# Patient Record
Sex: MALE | Race: WHITE | NOT HISPANIC OR LATINO | Employment: FULL TIME | RURAL
[De-identification: names, ages, dates, MRNs, and addresses within clinical notes are randomized per-mention and may not be internally consistent; named-entity substitution may affect disease eponyms.]

---

## 2022-07-07 ENCOUNTER — OFFICE VISIT (OUTPATIENT)
Dept: FAMILY MEDICINE CLINIC | Facility: CLINIC | Age: 57
End: 2022-07-07

## 2022-07-07 VITALS
HEART RATE: 67 BPM | HEIGHT: 69 IN | WEIGHT: 237.6 LBS | SYSTOLIC BLOOD PRESSURE: 112 MMHG | BODY MASS INDEX: 35.19 KG/M2 | OXYGEN SATURATION: 98 % | DIASTOLIC BLOOD PRESSURE: 82 MMHG

## 2022-07-07 DIAGNOSIS — Z12.5 SCREENING FOR PROSTATE CANCER: ICD-10-CM

## 2022-07-07 DIAGNOSIS — I10 BENIGN ESSENTIAL HTN: ICD-10-CM

## 2022-07-07 DIAGNOSIS — Z00.00 ANNUAL PHYSICAL EXAM: Primary | ICD-10-CM

## 2022-07-07 DIAGNOSIS — Z12.11 SCREENING FOR COLON CANCER: ICD-10-CM

## 2022-07-07 PROBLEM — E66.01 MORBID OBESITY: Status: ACTIVE | Noted: 2022-07-07

## 2022-07-07 LAB
BILIRUB BLD-MCNC: NEGATIVE MG/DL
CLARITY, POC: CLEAR
COLOR UR: YELLOW
EXPIRATION DATE: NORMAL
GLUCOSE UR STRIP-MCNC: NEGATIVE MG/DL
KETONES UR QL: NEGATIVE
LEUKOCYTE EST, POC: NEGATIVE
Lab: NORMAL
NITRITE UR-MCNC: NEGATIVE MG/ML
PH UR: 6.5 [PH] (ref 5–8)
PROT UR STRIP-MCNC: NEGATIVE MG/DL
RBC # UR STRIP: NEGATIVE /UL
SP GR UR: 1.02 (ref 1–1.03)
UROBILINOGEN UR QL: NORMAL

## 2022-07-07 PROCEDURE — 81003 URINALYSIS AUTO W/O SCOPE: CPT | Performed by: NURSE PRACTITIONER

## 2022-07-07 PROCEDURE — 36415 COLL VENOUS BLD VENIPUNCTURE: CPT | Performed by: NURSE PRACTITIONER

## 2022-07-07 PROCEDURE — 99396 PREV VISIT EST AGE 40-64: CPT | Performed by: NURSE PRACTITIONER

## 2022-07-07 RX ORDER — LISINOPRIL AND HYDROCHLOROTHIAZIDE 20; 12.5 MG/1; MG/1
TABLET ORAL
COMMUNITY
Start: 2022-04-14 | End: 2022-07-07 | Stop reason: SDUPTHER

## 2022-07-07 RX ORDER — LISINOPRIL AND HYDROCHLOROTHIAZIDE 20; 12.5 MG/1; MG/1
1 TABLET ORAL DAILY
Qty: 90 TABLET | Refills: 2 | Status: SHIPPED | OUTPATIENT
Start: 2022-07-07

## 2022-07-07 NOTE — ASSESSMENT & PLAN NOTE
Fasting labs drawn.  UA completed.  Meds refilled.  Goal blood pressure less than 140/90.  Let me know if gets to about that.  Patient states up-to-date on Cologuard.  Denies colonoscopy.  States he will discuss shingles and tetanus vaccines with his pharmacist.  Risk of meds discussed and understood.  Annual dental and eye exams encouraged.  Return to clinic or ED with any issues or concerns.

## 2022-07-07 NOTE — PROGRESS NOTES
"Chief Complaint  Med Refill    Subjective          KRISTIAN Humphries presents to [facility] for preventative yearly exam.   History of Present Illness     Presents for med refill of lisinopril hydrochlorothiazide.  Blood pressure well controlled on this medication.  No smoking no alcohol use.  Cologuard completed last year.  Tries to watch his diet and does stay active.  No dizziness no headache no chest pain no chest pressure no shortness of breath no trouble breathing no urinary or bowel issues.  Overall states he is doing well.    Objective   Vital Signs:   /82   Pulse 67   Ht 175.3 cm (69\")   Wt 108 kg (237 lb 9.6 oz)   SpO2 98%   BMI 35.09 kg/m²     Body mass index is 35.09 kg/m².    Predictive Model Details   No score data available for Risk of Fall        PHQ-9 Depression Screening  Little interest or pleasure in doing things? 0-->not at all   Feeling down, depressed, or hopeless? 0-->not at all   Trouble falling or staying asleep, or sleeping too much?     Feeling tired or having little energy?     Poor appetite or overeating?     Feeling bad about yourself - or that you are a failure or have let yourself or your family down?     Trouble concentrating on things, such as reading the newspaper or watching television?     Moving or speaking so slowly that other people could have noticed? Or the opposite - being so fidgety or restless that you have been moving around a lot more than usual?     Thoughts that you would be better off dead, or of hurting yourself in some way?     PHQ-9 Total Score 0   If you checked off any problems, how difficult have these problems made it for you to do your work, take care of things at home, or get along with other people?       Patient screened positive for depression based on a PHQ-9 score of 0 on 7/7/2022. Follow-up recommendations include:     Immunization History   Administered Date(s) Administered   • Hepatitis A 08/31/2018, " 03/14/2019   • Influenza, Unspecified 10/01/2018   • Tdap 01/03/2020       Review of Systems   Constitutional: Negative for chills, fatigue, fever, unexpected weight gain and unexpected weight loss.   HENT: Negative for congestion.    Eyes: Negative for blurred vision and visual disturbance.   Respiratory: Negative.    Cardiovascular: Negative.    Gastrointestinal: Negative.    Genitourinary: Negative for decreased urine volume, dysuria, frequency, hematuria and urgency.   Musculoskeletal: Negative for arthralgias and back pain.   Skin: Negative for rash.   Neurological: Negative.    Psychiatric/Behavioral: Negative.        Past History:  Medical History: has a past medical history of Hypertension.   Surgical History: has no past surgical history on file.   Family History: family history includes Alzheimer's disease in his mother; Coronary artery disease in his mother.   Social History: reports that he has never smoked. He has never used smokeless tobacco.      Current Outpatient Medications:   •  lisinopril-hydrochlorothiazide (PRINZIDE,ZESTORETIC) 20-12.5 MG per tablet, Take 1 tablet by mouth Daily., Disp: 90 tablet, Rfl: 2   Allergies: Patient has no known allergies.    Physical Exam  Constitutional:       Appearance: Normal appearance.   HENT:      Head: Normocephalic.      Right Ear: Tympanic membrane, ear canal and external ear normal.      Left Ear: Tympanic membrane, ear canal and external ear normal.      Nose: Nose normal.      Mouth/Throat:      Mouth: Mucous membranes are moist.      Pharynx: Oropharynx is clear.   Eyes:      Extraocular Movements: Extraocular movements intact.      Conjunctiva/sclera: Conjunctivae normal.      Pupils: Pupils are equal, round, and reactive to light.   Cardiovascular:      Rate and Rhythm: Normal rate and regular rhythm.      Heart sounds: Normal heart sounds.   Pulmonary:      Effort: Pulmonary effort is normal.      Breath sounds: Normal breath sounds.   Abdominal:       General: Abdomen is flat. Bowel sounds are normal.      Palpations: Abdomen is soft.   Skin:     General: Skin is warm.   Neurological:      General: No focal deficit present.      Mental Status: He is alert and oriented to person, place, and time. Mental status is at baseline.   Psychiatric:         Mood and Affect: Mood normal.         Behavior: Behavior normal.         Thought Content: Thought content normal.         Judgment: Judgment normal.          Result Review :                     Assessment and Plan    Diagnoses and all orders for this visit:    1. Annual physical exam (Primary)  Assessment & Plan:  Fasting labs drawn.  UA completed.  Meds refilled.  Goal blood pressure less than 140/90.  Let me know if gets to about that.  Patient states up-to-date on Cologuard.  Denies colonoscopy.  States he will discuss shingles and tetanus vaccines with his pharmacist.  Risk of meds discussed and understood.  Annual dental and eye exams encouraged.  Return to clinic or ED with any issues or concerns.    Orders:  -     CBC & Differential; Future  -     Comprehensive Metabolic Panel; Future  -     TSH Rfx On Abnormal To Free T4; Future  -     POC Urinalysis Dipstick, Automated; Future    2. Benign essential HTN  -     Lipid Panel; Future    3. Screening for prostate cancer  -     PSA Total, Reflex To Free; Future    4. Screening for colon cancer    Other orders  -     lisinopril-hydrochlorothiazide (PRINZIDE,ZESTORETIC) 20-12.5 MG per tablet; Take 1 tablet by mouth Daily.  Dispense: 90 tablet; Refill: 2            Class 2 Severe Obesity (BMI >=35 and <=39.9). Obesity-related health conditions include the following: hypertension. Obesity is improving with treatment. BMI is is above average; BMI management plan is completed. We discussed portion control and increasing exercise.       Follow Up   Return in about 1 year (around 7/7/2023).  Patient was given instructions and counseling regarding his condition or for health  maintenance advice. Please see specific information pulled into the AVS if appropriate.     MARY KAY Ireland

## 2022-07-08 LAB
ALBUMIN SERPL-MCNC: 4.4 G/DL (ref 3.8–4.9)
ALBUMIN/GLOB SERPL: 1.7 {RATIO} (ref 1.2–2.2)
ALP SERPL-CCNC: 81 IU/L (ref 44–121)
ALT SERPL-CCNC: 14 IU/L (ref 0–44)
AST SERPL-CCNC: 19 IU/L (ref 0–40)
BASOPHILS # BLD AUTO: 0.1 X10E3/UL (ref 0–0.2)
BASOPHILS NFR BLD AUTO: 2 %
BILIRUB SERPL-MCNC: 0.9 MG/DL (ref 0–1.2)
BUN SERPL-MCNC: 11 MG/DL (ref 6–24)
BUN/CREAT SERPL: 14 (ref 9–20)
CALCIUM SERPL-MCNC: 9.3 MG/DL (ref 8.7–10.2)
CHLORIDE SERPL-SCNC: 94 MMOL/L (ref 96–106)
CHOLEST SERPL-MCNC: 188 MG/DL (ref 100–199)
CO2 SERPL-SCNC: 25 MMOL/L (ref 20–29)
CREAT SERPL-MCNC: 0.77 MG/DL (ref 0.76–1.27)
EGFRCR SERPLBLD CKD-EPI 2021: 104 ML/MIN/1.73
EOSINOPHIL # BLD AUTO: 0.5 X10E3/UL (ref 0–0.4)
EOSINOPHIL NFR BLD AUTO: 8 %
ERYTHROCYTE [DISTWIDTH] IN BLOOD BY AUTOMATED COUNT: 11.7 % (ref 11.6–15.4)
GLOBULIN SER CALC-MCNC: 2.6 G/DL (ref 1.5–4.5)
GLUCOSE SERPL-MCNC: 87 MG/DL (ref 65–99)
HCT VFR BLD AUTO: 41.6 % (ref 37.5–51)
HDLC SERPL-MCNC: 60 MG/DL
HGB BLD-MCNC: 14.4 G/DL (ref 13–17.7)
IMM GRANULOCYTES # BLD AUTO: 0 X10E3/UL (ref 0–0.1)
IMM GRANULOCYTES NFR BLD AUTO: 0 %
LDLC SERPL CALC-MCNC: 116 MG/DL (ref 0–99)
LYMPHOCYTES # BLD AUTO: 1.6 X10E3/UL (ref 0.7–3.1)
LYMPHOCYTES NFR BLD AUTO: 26 %
MCH RBC QN AUTO: 30.3 PG (ref 26.6–33)
MCHC RBC AUTO-ENTMCNC: 34.6 G/DL (ref 31.5–35.7)
MCV RBC AUTO: 88 FL (ref 79–97)
MONOCYTES # BLD AUTO: 0.6 X10E3/UL (ref 0.1–0.9)
MONOCYTES NFR BLD AUTO: 9 %
NEUTROPHILS # BLD AUTO: 3.4 X10E3/UL (ref 1.4–7)
NEUTROPHILS NFR BLD AUTO: 55 %
PLATELET # BLD AUTO: 304 X10E3/UL (ref 150–450)
POTASSIUM SERPL-SCNC: 4.4 MMOL/L (ref 3.5–5.2)
PROT SERPL-MCNC: 7 G/DL (ref 6–8.5)
PSA SERPL-MCNC: 1.5 NG/ML (ref 0–4)
RBC # BLD AUTO: 4.75 X10E6/UL (ref 4.14–5.8)
REFLEX: NORMAL
SODIUM SERPL-SCNC: 133 MMOL/L (ref 134–144)
TRIGL SERPL-MCNC: 62 MG/DL (ref 0–149)
TSH SERPL DL<=0.005 MIU/L-ACNC: 1.19 UIU/ML (ref 0.45–4.5)
VLDLC SERPL CALC-MCNC: 12 MG/DL (ref 5–40)
WBC # BLD AUTO: 6.2 X10E3/UL (ref 3.4–10.8)

## 2022-07-12 ENCOUNTER — PATIENT MESSAGE (OUTPATIENT)
Dept: FAMILY MEDICINE CLINIC | Facility: CLINIC | Age: 57
End: 2022-07-12

## 2023-04-17 RX ORDER — LISINOPRIL AND HYDROCHLOROTHIAZIDE 20; 12.5 MG/1; MG/1
TABLET ORAL
Qty: 90 TABLET | Refills: 2 | Status: SHIPPED | OUTPATIENT
Start: 2023-04-17

## 2024-07-08 DIAGNOSIS — I10 PRIMARY HYPERTENSION: ICD-10-CM

## 2024-07-08 RX ORDER — LISINOPRIL AND HYDROCHLOROTHIAZIDE 20; 12.5 MG/1; MG/1
1 TABLET ORAL DAILY
Qty: 30 TABLET | Refills: 0 | Status: SHIPPED | OUTPATIENT
Start: 2024-07-08 | End: 2024-07-11 | Stop reason: SDUPTHER

## 2024-07-11 ENCOUNTER — OFFICE VISIT (OUTPATIENT)
Dept: FAMILY MEDICINE CLINIC | Facility: CLINIC | Age: 59
End: 2024-07-11
Payer: COMMERCIAL

## 2024-07-11 VITALS
HEART RATE: 67 BPM | WEIGHT: 240.44 LBS | OXYGEN SATURATION: 97 % | BODY MASS INDEX: 35.61 KG/M2 | DIASTOLIC BLOOD PRESSURE: 84 MMHG | SYSTOLIC BLOOD PRESSURE: 126 MMHG | HEIGHT: 69 IN

## 2024-07-11 DIAGNOSIS — Z12.11 SCREENING FOR COLON CANCER: ICD-10-CM

## 2024-07-11 DIAGNOSIS — Z11.59 NEED FOR HEPATITIS C SCREENING TEST: ICD-10-CM

## 2024-07-11 DIAGNOSIS — R73.03 PREDIABETES: ICD-10-CM

## 2024-07-11 DIAGNOSIS — Z12.5 SCREENING FOR PROSTATE CANCER: ICD-10-CM

## 2024-07-11 DIAGNOSIS — I10 BENIGN ESSENTIAL HTN: ICD-10-CM

## 2024-07-11 DIAGNOSIS — Z00.00 ANNUAL PHYSICAL EXAM: Primary | ICD-10-CM

## 2024-07-11 DIAGNOSIS — I10 PRIMARY HYPERTENSION: ICD-10-CM

## 2024-07-11 PROBLEM — E66.9 OBESITY (BMI 35.0-39.9 WITHOUT COMORBIDITY): Status: ACTIVE | Noted: 2024-07-11

## 2024-07-11 PROCEDURE — 99396 PREV VISIT EST AGE 40-64: CPT | Performed by: NURSE PRACTITIONER

## 2024-07-11 RX ORDER — FAMOTIDINE 20 MG/1
20 TABLET, FILM COATED ORAL 2 TIMES DAILY
COMMUNITY

## 2024-07-11 RX ORDER — LISINOPRIL AND HYDROCHLOROTHIAZIDE 20; 12.5 MG/1; MG/1
1 TABLET ORAL DAILY
Qty: 90 TABLET | Refills: 2 | Status: SHIPPED | OUTPATIENT
Start: 2024-07-11

## 2024-07-11 NOTE — PROGRESS NOTES
"Chief Complaint  Hypertension    Subjective          KRISTIAN Humphries presents to [facility] for preventative yearly exam.   History of Present Illness    Presents for annual exam.  Is fasting.  No smoking no alcohol use.  Tries to watch his diet he does stay active.  Hypertension well-controlled on lisinopril hydrochlorothiazide.  Due colonoscopy.  Tetanus vaccine up-to-date.  No dizziness no headache no chest pain no chest pressure no shortness of breath no trouble breathing no urinary or bowel issues.  Overall states he is doing well.    Objective   Vital Signs:   /84   Pulse 67   Ht 175.3 cm (69\")   Wt 109 kg (240 lb 7 oz)   SpO2 97%   BMI 35.51 kg/m²     Body mass index is 35.51 kg/m².    Predictive Model Details   No score data available for Risk of Fall        PHQ-9 Depression Screening  Little interest or pleasure in doing things? 0-->not at all   Feeling down, depressed, or hopeless? 0-->not at all   Trouble falling or staying asleep, or sleeping too much?     Feeling tired or having little energy?     Poor appetite or overeating?     Feeling bad about yourself - or that you are a failure or have let yourself or your family down?     Trouble concentrating on things, such as reading the newspaper or watching television?     Moving or speaking so slowly that other people could have noticed? Or the opposite - being so fidgety or restless that you have been moving around a lot more than usual?     Thoughts that you would be better off dead, or of hurting yourself in some way?     PHQ-9 Total Score 0   If you checked off any problems, how difficult have these problems made it for you to do your work, take care of things at home, or get along with other people?       Patient screened positive for depression based on a PHQ-9 score of 0 on 7/11/2024. Follow-up recommendations include:        Immunization History   Administered Date(s) Administered    Hepatitis A 08/31/2018, " 03/14/2019    Influenza, Unspecified 10/01/2018    Tdap 01/03/2020       Review of Systems   Constitutional: Negative.    HENT: Negative.     Eyes: Negative.    Respiratory: Negative.     Cardiovascular: Negative.    Gastrointestinal: Negative.    Endocrine: Negative for polydipsia, polyphagia and polyuria.   Genitourinary: Negative.    Musculoskeletal: Negative.    Skin: Negative.    Neurological: Negative.    Psychiatric/Behavioral: Negative.         Past History:  Medical History: has a past medical history of Hypertension.   Surgical History: has no past surgical history on file.   Family History: family history includes Alzheimer's disease in his mother; Coronary artery disease in his mother.   Social History: reports that he has never smoked. He has never used smokeless tobacco. He reports current alcohol use of about 2.0 - 3.0 standard drinks of alcohol per week. He reports that he does not use drugs.      Current Outpatient Medications:     famotidine (PEPCID) 20 MG tablet, Take 1 tablet by mouth 2 (Two) Times a Day. OTC, Disp: , Rfl:     lisinopril-hydrochlorothiazide (PRINZIDE,ZESTORETIC) 20-12.5 MG per tablet, Take 1 tablet by mouth Daily., Disp: 90 tablet, Rfl: 2   Allergies: Patient has no known allergies.    Physical Exam  Constitutional:       Appearance: Normal appearance.   HENT:      Head: Normocephalic.      Right Ear: Tympanic membrane, ear canal and external ear normal.      Left Ear: Tympanic membrane, ear canal and external ear normal.      Nose: Nose normal.      Mouth/Throat:      Mouth: Mucous membranes are moist.      Pharynx: Oropharynx is clear.   Eyes:      Extraocular Movements: Extraocular movements intact.      Conjunctiva/sclera: Conjunctivae normal.      Pupils: Pupils are equal, round, and reactive to light.   Cardiovascular:      Rate and Rhythm: Normal rate and regular rhythm.      Heart sounds: Normal heart sounds.   Pulmonary:      Effort: Pulmonary effort is normal.       Breath sounds: Normal breath sounds.   Abdominal:      General: Abdomen is flat. Bowel sounds are normal.      Palpations: Abdomen is soft.   Genitourinary:     Comments: Denied   Musculoskeletal:         General: Normal range of motion.      Cervical back: Normal range of motion.   Skin:     General: Skin is warm.   Neurological:      General: No focal deficit present.      Mental Status: He is alert and oriented to person, place, and time. Mental status is at baseline.   Psychiatric:         Mood and Affect: Mood normal.         Behavior: Behavior normal.         Thought Content: Thought content normal.         Judgment: Judgment normal.          Result Review :                     Assessment and Plan    Diagnoses and all orders for this visit:    1. Annual physical exam (Primary)  Assessment & Plan:  Labs drawn.  UA completed.  Meds refilled.  Goal blood pressure less than 140/90.  Let me know if gets 2 or above that.  PHQ-9 completed and discussed.  No thoughts of suicide or hurting himself or anyone else.  Proper diet and exercise plan discussed and encouraged.  Annual dental and eye exams encouraged.  Patient denies colonoscopy and understands the risks of not doing.  He does agree to Cologuard.  Informed him if he has not received that in the mail within the next couple of weeks to contact me and let me know.  Call afterwards for results.  Tetanus vaccine up-to-date.  Encouraged to discuss shingles and COVID vaccines further with his pharmacist.  Risk of meds discussed and understood.  Education provided.  Return to clinic or ED with any issues or concerns.    Orders:  -     CBC & Differential  -     Comprehensive Metabolic Panel  -     TSH Rfx On Abnormal To Free T4  -     POC Urinalysis Dipstick, Automated; Future    2. Benign essential HTN  -     Lipid Panel  -     lisinopril-hydrochlorothiazide (PRINZIDE,ZESTORETIC) 20-12.5 MG per tablet; Take 1 tablet by mouth Daily.  Dispense: 90 tablet; Refill: 2    3.  Screening for colon cancer  -     Cologuard - Stool, Per Rectum; Future    4. Screening for prostate cancer  -     PSA Screen    5. Need for hepatitis C screening test  -     Hepatitis C Antibody    6. Prediabetes  -     Hemoglobin A1c    7. Primary hypertension  Comments:  Continue current meds. Monitor BP. We discussed diet and exercise.              Class 2 Severe Obesity (BMI >=35 and <=39.9). Obesity-related health conditions include the following: hypertension. Obesity is improving with treatment. BMI is is above average; BMI management plan is completed. We discussed portion control and increasing exercise.       Follow Up   Return in about 6 months (around 1/11/2025).  Patient was given instructions and counseling regarding his condition or for health maintenance advice. Please see specific information pulled into the AVS if appropriate.     MARY KAY Ireland

## 2024-07-11 NOTE — PATIENT INSTRUCTIONS
Obesity, Adult  Obesity is the condition of having too much total body fat. Being overweight or obese means that your weight is greater than what is considered healthy for your body size. Obesity is determined by a measurement called BMI (body mass index). BMI is an estimate of body fat and is calculated from height and weight. For adults, a BMI of 30 or higher is considered obese.  Obesity can lead to other health concerns and major illnesses, including:  Stroke.  Coronary artery disease (CAD).  Type 2 diabetes.  Some types of cancer, including cancers of the colon, breast, uterus, and gallbladder.  High blood pressure (hypertension).  High cholesterol.  Gallbladder stones.  Obesity can also contribute to:  Osteoarthritis.  Sleep apnea.  Infertility problems.  What are the causes?  Common causes of this condition include:  Eating daily meals that are high in calories, sugar, and fat.  Drinking high amounts of sugar-sweetened beverages, such as soft drinks.  Being born with genes that may make you more likely to become obese.  Having a medical condition that causes obesity, including:  Hypothyroidism.  Polycystic ovarian syndrome (PCOS).  Binge-eating disorder.  Cushing syndrome.  Taking certain medicines, such as steroids, antidepressants, and seizure medicines.  Not being physically active (sedentary lifestyle).  Not getting enough sleep.  What increases the risk?  The following factors may make you more likely to develop this condition:  Having a family history of obesity.  Living in an area with limited access to:  [place], recreation centers, or sidewalks.  Healthy food choices, such as grocery stores and [employer].  What are the signs or symptoms?  The main sign of this condition is having too much body fat.  How is this diagnosed?  This condition is diagnosed based on:  Your BMI. If you are an adult with a BMI of 30 or higher, you are considered obese.  Your waist circumference. This measures the  distance around your waistline.  Your skinfold thickness. Your health care provider may gently pinch a fold of your skin and measure it.  You may have other tests to check for underlying conditions.  How is this treated?  Treatment for this condition often includes changing your lifestyle. Treatment may include some or all of the following:  Dietary changes. This may include developing a healthy meal plan.  Regular physical activity. This may include activity that causes your heart to beat faster (aerobic exercise) and strength training. Work with your health care provider to design an exercise program that works for you.  Medicine to help you lose weight if you are unable to lose one pound a week after six weeks of healthy eating and more physical activity.  Treating conditions that cause the obesity (underlying conditions).  Surgery. Surgical options may include gastric banding and gastric bypass. Surgery may be done if:  Other treatments have not helped to improve your condition.  You have a BMI of 40 or higher.  You have life-threatening health problems related to obesity.  Follow these instructions at home:  Eating and drinking    Follow recommendations from your health care provider about what you eat and drink. Your health care provider may advise you to:  Limit fast food, sweets, and processed snack foods.  Choose low-fat options, such as low-fat milk instead of whole milk.  Eat five or more servings of fruits or vegetables every day.  Choose healthy foods when you eat out.  Keep low-fat snacks available.  Limit sugary drinks, such as soda, fruit juice, sweetened iced tea, and flavored milk.  Drink enough water to keep your urine pale yellow.  Do not follow a fad diet. Fad diets can be unhealthy and even dangerous.  Other healthful choices include:  Eat at home more often. This gives you more control over what you eat.  Learn to read food labels. This will help you understand how much food is considered one  serving.  Learn what a healthy serving size is.  Physical activity  Exercise regularly, as told by your health care provider.  Most adults should get up to 150 minutes of moderate-intensity exercise every week.  Ask your health care provider what types of exercise are safe for you and how often you should exercise.  Warm up and stretch before being active.  Cool down and stretch after being active.  Rest between periods of activity.  Lifestyle  Work with your health care provider and a dietitian to set a weight-loss goal that is healthy and reasonable for you.  Limit your screen time.  Find ways to reward yourself that do not involve food.  Do not drink alcohol if:  Your health care provider tells you not to drink.  You are pregnant, may be pregnant, or are planning to become pregnant.  If you drink alcohol:  Limit how much you have to:  0-1 drink a day for women.  0-2 drinks a day for men.  Know how much alcohol is in your drink. In the [place] one drink equals one 12 oz bottle of beer (355 mL), one 5 oz glass of wine (148 mL), or one 1½ oz glass of hard liquor (44 mL).  General instructions  Keep a weight-loss journal to keep track of the food you eat and how much exercise you get.  Take over-the-counter and prescription medicines only as told by your health care provider.  Take vitamins and supplements only as told by your health care provider.  Consider joining a support group. Your health care provider may be able to recommend a support group.  Pay attention to your mental health as obesity can lead to depression or self esteem issues.  Keep all follow-up visits. This is important.  Contact a health care provider if:  You are unable to meet your weight-loss goal after six weeks of dietary and lifestyle changes.  You have trouble breathing.  Summary  Obesity is the condition of having too much total body fat.  Being overweight or obese means that your weight is greater than what is considered healthy for your body  size.  Work with your health care provider and a dietitian to set a weight-loss goal that is healthy and reasonable for you.  Exercise regularly, as told by your health care provider. Ask your health care provider what types of exercise are safe for you and how often you should exercise.  This information is not intended to replace advice given to you by your health care provider. Make sure you discuss any questions you have with your health care provider.  Document Revised: 07/26/2022 Document Reviewed: 07/26/2022  [employer] Interactive Patient Education © 2024 [employer]    Exercising to Lose Weight  Getting regular exercise is important for everyone. It is especially important if you are overweight. Being overweight increases your risk of heart disease, stroke, diabetes, high blood pressure, and several types of cancer. Exercising, and reducing the calories you consume, can help you lose weight and improve fitness and health.  Exercise can be moderate or vigorous intensity. To lose weight, most people need to do a certain amount of moderate or vigorous-intensity exercise each week.  How can exercise affect me?  You lose weight when you exercise enough to burn more calories than you eat. Exercise also reduces body fat and builds muscle. The more muscle you have, the more calories you burn. Exercise also:  Improves mood.  Reduces stress and tension.  Improves your overall fitness, flexibility, and endurance.  Increases bone strength.  Moderate-intensity exercise    Moderate-intensity exercise is any activity that gets you moving enough to burn at least three times more energy (calories) than if you were sitting.  Examples of moderate exercise include:  Walking a mile in 15 minutes.  Doing light yard work.  Biking at an easy pace.  Most people should get at least 150 minutes of moderate-intensity exercise a week to maintain their body weight.  Vigorous-intensity exercise  Vigorous-intensity exercise is any activity that gets  you moving enough to burn at least six times more calories than if you were sitting. When you exercise at this intensity, you should be working hard enough that you are not able to carry on a conversation.  Examples of vigorous exercise include:  Running.  Playing a team sport, such as football, basketball, and soccer.  Jumping rope.  Most people should get at least 75 minutes a week of vigorous exercise to maintain their body weight.  What actions can I take to lose weight?  The amount of exercise you need to lose weight depends on:  Your age.  The type of exercise.  Any health conditions you have.  Your overall physical ability.  Talk to your health care provider about how much exercise you need and what types of activities are safe for you.  Nutrition    Make changes to your diet as told by your health care provider or diet and nutrition specialist (dietitian). This may include:  Eating fewer calories.  Eating more protein.  Eating less unhealthy fats.  Eating a diet that includes fresh fruits and vegetables, whole grains, low-fat dairy products, and lean protein.  Avoiding foods with added fat, salt, and sugar.  Drink plenty of water while you exercise to prevent dehydration or heat stroke.  Activity  Choose an activity that you enjoy and set realistic goals. Your health care provider can help you make an exercise plan that works for you.  Exercise at a moderate or vigorous intensity most days of the week.  The intensity of exercise may vary from person to person. You can tell how intense a workout is for you by paying attention to your breathing and heartbeat. Most people will notice their breathing and heartbeat get faster with more intense exercise.  Do resistance training twice each week, such as:  Push-ups.  Sit-ups.  Lifting weights.  Using resistance bands.  Getting short amounts of exercise can be just as helpful as long, structured periods of exercise. If you have trouble finding time to exercise, try  doing these things as part of your daily routine:  Get up, stretch, and walk around every 30 minutes throughout the day.  Go for a walk during your lunch break.  Park your car farther away from your destination.  If you take public transportation, get off one stop early and walk the rest of the way.  Make phone calls while standing up and walking around.  Take the stairs instead of elevators or escalators.  Wear comfortable clothes and shoes with good support.  Do not exercise so much that you hurt yourself, feel dizzy, or get very short of breath.  Where to find more information  [place] Department of Health and Human Services: www.hhs.gov  Centers for Disease Control and Prevention: www.cdc.gov  Contact a health care provider:  Before starting a new exercise program.  If you have questions or concerns about your weight.  If you have a medical problem that keeps you from exercising.  Get help right away if:  You have any of the following while exercising:  Injury.  Dizziness.  Difficulty breathing or shortness of breath that does not go away when you stop exercising.  Chest pain.  Rapid heartbeat.  These symptoms may represent a serious problem that is an emergency. Do not wait to see if the symptoms will go away. Get medical help right away. Call your local emergency services (911 in the [place] Do not drive yourself to the hospital.  Summary  Getting regular exercise is especially important if you are overweight.  Being overweight increases your risk of heart disease, stroke, diabetes, high blood pressure, and several types of cancer.  Losing weight happens when you burn more calories than you eat.  Reducing the amount of calories you eat, and getting regular moderate or vigorous exercise each week, helps you lose weight.  This information is not intended to replace advice given to you by your health care provider. Make sure you discuss any questions you have with your health care provider.  Document Revised:  02/13/2022 Document Reviewed: 02/13/2022  [employer] Patient Education © 2024 [employer]    MyPlate from [employer]    MyPlate is an outline of a general healthy diet based on the Dietary Guidelines for Americans, 3187-7289, from the [place] Department of Agriculture (USDA). It sets guidelines for how much food you should eat from each food group based on your age, sex, and level of physical activity.  What are tips for following MyPlate?  To follow MyPlate recommendations:  Eat a wide variety of fruits and vegetables, grains, and protein foods.  Serve smaller portions and eat less food throughout the day.  Limit portion sizes to avoid overeating.  Enjoy your food.  Get at least 150 minutes of exercise every week. This is about 30 minutes each day, 5 or more days per week.  It can be difficult to have every meal look like MyPlate. Think about MyPlate as eating guidelines for an entire day, rather than each individual meal.  Fruits and vegetables  Make one half of your plate fruits and vegetables.  Eat many different colors of fruits and vegetables each day.  For a 2,000-calorie daily food plan, eat:  2½ cups of vegetables every day.  2 cups of fruit every day.  1 cup is equal to:  1 cup raw or cooked vegetables.  1 cup raw fruit.  1 medium-sized orange, apple, or banana.  1 cup 100% fruit or vegetable juice.  2 cups raw leafy greens, such as lettuce, spinach, or kale.  ½ cup dried fruit.  Grains  One fourth of your plate should be grains.  Make at least half of the grains you eat each day whole grains.  For a 2,000-calorie daily food plan, eat 6 oz of grains every day.  1 oz is equal to:  1 slice bread.  1 cup cereal.  ½ cup cooked rice, cereal, or pasta.  Protein  One fourth of your plate should be protein.  Eat a wide variety of protein foods, including meat, poultry, fish, eggs, beans, nuts, and tofu.  For a 2,000-calorie daily food plan, eat 5½ oz of protein every day.  1 oz is equal to:  1 oz meat, poultry, or fish.  ¼  cup cooked beans.  1 egg.  ½ oz nuts or seeds.  1 Tbsp peanut butter.  Dairy  Drink fat-free or low-fat (1%) milk.  Eat or drink dairy as a side to meals.  For a 2,000-calorie daily food plan, eat or drink 3 cups of dairy every day.  1 cup is equal to:  1 cup milk, yogurt, cottage cheese, or soy milk (soy beverage).  2 oz processed cheese.  1½ oz natural cheese.  Fats, oils, salt, and sugars  Only small amounts of oils are recommended.  Avoid foods that are high in calories and low in nutritional value (empty calories), like foods high in fat or added sugars.  Choose foods that are low in salt (sodium). Choose foods that have less than 140 milligrams (mg) of sodium per serving.  Drink water instead of sugary drinks. Drink enough fluid to keep your urine pale yellow.  Where to find support  Work with your health care provider or a dietitian to develop a customized eating plan that is right for you.  Download an haseeb (mobile application) to help you track your daily food intake.  Where to find more information  USDA: ChooseMyPlate.gov  Summary  MyPlate is a general guideline for healthy eating from the USDA. It is based on the Dietary Guidelines for Americans, 2210-5145.  In general, fruits and vegetables should take up one half of your plate, grains should take up one fourth of your plate, and protein should take up one fourth of your plate.  This information is not intended to replace advice given to you by your health care provider. Make sure you discuss any questions you have with your health care provider.  Document Revised: 11/08/2021 Document Reviewed: 11/08/2021  Elsevier Patient Education © 2024 [employer] Inc.

## 2024-07-11 NOTE — ASSESSMENT & PLAN NOTE
Labs drawn.  UA completed.  Meds refilled.  Goal blood pressure less than 140/90.  Let me know if gets 2 or above that.  PHQ-9 completed and discussed.  No thoughts of suicide or hurting himself or anyone else.  Proper diet and exercise plan discussed and encouraged.  Annual dental and eye exams encouraged.  Patient denies colonoscopy and understands the risks of not doing.  He does agree to Cologuard.  Informed him if he has not received that in the mail within the next couple of weeks to contact me and let me know.  Call afterwards for results.  Tetanus vaccine up-to-date.  Encouraged to discuss shingles and COVID vaccines further with his pharmacist.  Risk of meds discussed and understood.  Education provided.  Return to clinic or ED with any issues or concerns.

## 2024-07-12 LAB
ALBUMIN SERPL-MCNC: 4.5 G/DL (ref 3.8–4.9)
ALP SERPL-CCNC: 74 IU/L (ref 44–121)
ALT SERPL-CCNC: 17 IU/L (ref 0–44)
AST SERPL-CCNC: 21 IU/L (ref 0–40)
BASOPHILS # BLD AUTO: 0.1 X10E3/UL (ref 0–0.2)
BASOPHILS NFR BLD AUTO: 2 %
BILIRUB SERPL-MCNC: 1.3 MG/DL (ref 0–1.2)
BUN SERPL-MCNC: 8 MG/DL (ref 8–27)
BUN/CREAT SERPL: 10 (ref 10–24)
CALCIUM SERPL-MCNC: 9.3 MG/DL (ref 8.6–10.2)
CHLORIDE SERPL-SCNC: 90 MMOL/L (ref 96–106)
CHOLEST SERPL-MCNC: 206 MG/DL (ref 100–199)
CO2 SERPL-SCNC: 26 MMOL/L (ref 20–29)
CREAT SERPL-MCNC: 0.82 MG/DL (ref 0.76–1.27)
EGFRCR SERPLBLD CKD-EPI 2021: 101 ML/MIN/1.73
EOSINOPHIL # BLD AUTO: 0.5 X10E3/UL (ref 0–0.4)
EOSINOPHIL NFR BLD AUTO: 9 %
ERYTHROCYTE [DISTWIDTH] IN BLOOD BY AUTOMATED COUNT: 11.8 % (ref 11.6–15.4)
GLOBULIN SER CALC-MCNC: 2.8 G/DL (ref 1.5–4.5)
GLUCOSE SERPL-MCNC: 90 MG/DL (ref 70–99)
HBA1C MFR BLD: 5.7 % (ref 4.8–5.6)
HCT VFR BLD AUTO: 42.1 % (ref 37.5–51)
HCV IGG SERPL QL IA: NON REACTIVE
HDLC SERPL-MCNC: 64 MG/DL
HGB BLD-MCNC: 14.6 G/DL (ref 13–17.7)
IMM GRANULOCYTES # BLD AUTO: 0 X10E3/UL (ref 0–0.1)
IMM GRANULOCYTES NFR BLD AUTO: 0 %
LDLC SERPL CALC-MCNC: 126 MG/DL (ref 0–99)
LYMPHOCYTES # BLD AUTO: 1.8 X10E3/UL (ref 0.7–3.1)
LYMPHOCYTES NFR BLD AUTO: 30 %
MCH RBC QN AUTO: 30.3 PG (ref 26.6–33)
MCHC RBC AUTO-ENTMCNC: 34.7 G/DL (ref 31.5–35.7)
MCV RBC AUTO: 87 FL (ref 79–97)
MONOCYTES # BLD AUTO: 0.4 X10E3/UL (ref 0.1–0.9)
MONOCYTES NFR BLD AUTO: 7 %
NEUTROPHILS # BLD AUTO: 3.1 X10E3/UL (ref 1.4–7)
NEUTROPHILS NFR BLD AUTO: 52 %
PLATELET # BLD AUTO: 306 X10E3/UL (ref 150–450)
POTASSIUM SERPL-SCNC: 4.6 MMOL/L (ref 3.5–5.2)
PROT SERPL-MCNC: 7.3 G/DL (ref 6–8.5)
PSA SERPL-MCNC: 1.5 NG/ML (ref 0–4)
RBC # BLD AUTO: 4.82 X10E6/UL (ref 4.14–5.8)
SODIUM SERPL-SCNC: 130 MMOL/L (ref 134–144)
TRIGL SERPL-MCNC: 87 MG/DL (ref 0–149)
TSH SERPL DL<=0.005 MIU/L-ACNC: 1.37 UIU/ML (ref 0.45–4.5)
VLDLC SERPL CALC-MCNC: 16 MG/DL (ref 5–40)
WBC # BLD AUTO: 5.9 X10E3/UL (ref 3.4–10.8)

## 2024-07-16 RX ORDER — LISINOPRIL 20 MG/1
20 TABLET ORAL DAILY
Qty: 30 TABLET | Refills: 1 | Status: SHIPPED | OUTPATIENT
Start: 2024-07-16

## 2024-07-26 ENCOUNTER — TELEPHONE (OUTPATIENT)
Dept: FAMILY MEDICINE CLINIC | Facility: CLINIC | Age: 59
End: 2024-07-26

## 2024-07-26 RX ORDER — LISINOPRIL 40 MG/1
40 TABLET ORAL DAILY
Qty: 30 TABLET | Refills: 1 | Status: SHIPPED | OUTPATIENT
Start: 2024-07-26 | End: 2024-08-26 | Stop reason: SDUPTHER

## 2024-07-26 NOTE — TELEPHONE ENCOUNTER
"----- Message from Linda SCHULER sent at 7/26/2024 10:36 AM EDT -----  Regarding: FW: New BP meds without the water pill.  Contact: [phone]    ----- Message -----  From: KRISTIAN Humphries \"JUWAN\"  Sent: 7/26/2024  10:33 AM EDT  To: [facility]  Subject: New BP meds without the water pill.              I had had a couple cups of coffee this morning but had been about an hour after the coffee when I checked it.  "

## 2024-07-26 NOTE — TELEPHONE ENCOUNTER
Please let patient know I recommend we increase his lisinopril.  I have called him in a new dose of lisinopril 40 mg.  Continue to monitor blood pressure and let me know blood pressure readings in a week, sooner if worsens.  Also recommend he come have the repeat blood work drawn soon so that we can recheck his sodium levels.  Thanks

## 2024-07-26 NOTE — TELEPHONE ENCOUNTER
Spoke with patient. Relayed provider message. Pt voiced understanding and had no further questions at this time.

## 2024-08-06 ENCOUNTER — TELEPHONE (OUTPATIENT)
Dept: FAMILY MEDICINE CLINIC | Facility: CLINIC | Age: 59
End: 2024-08-06
Payer: COMMERCIAL

## 2024-08-06 NOTE — TELEPHONE ENCOUNTER
Continue to check blood pressure for a few more days.  Let me know in 2 to 3 days how the blood pressure continues.  If it is not consistently below 140/90 then we may need to add on a small dose of another blood pressure medication.  Keep me updated.  Thanks

## 2024-08-06 NOTE — TELEPHONE ENCOUNTER
"----- Message from Linda SCHULER sent at 8/6/2024 10:15 AM EDT -----  Regarding: FW: BP meds  Contact: [phone]    ----- Message -----  From: KRISTIAN Humphries \"JUWAN\"  Sent: 8/5/2024   9:59 PM EDT  To: [facility]  Subject: BP meds                                          [place],  After a week on the 40 mg lisinopril, my BP is running in the low to mid 140s for the top number, and the low to mid 90s on the bottom number. When I first switched it took a couple days to settle in these numbers. I think maybe one day it was something like 138/88.  "

## 2024-08-06 NOTE — TELEPHONE ENCOUNTER
"----- Message from Linda SCHULER sent at 8/6/2024 10:15 AM EDT -----  Regarding: FW: BP meds  Contact: [phone]    ----- Message -----  From: KRISTIAN Humphries \"JUWAN\"  Sent: 8/5/2024   9:59 PM EDT  To: [facility]  Subject: BP meds                                          [place],  After a week on the 40 mg lisinopril, my BP is running in the low to mid 140s for the top number, and the low to mid 90s on the bottom number. When I first switched it took a couple days to settle in these numbers. I think maybe one day it was something like 138/88.  "

## 2024-08-07 NOTE — TELEPHONE ENCOUNTER
Pt contacted  Pt to let us know in couple days on how is bp is doing and if we would need to change up his medication.

## 2024-08-20 ENCOUNTER — PATIENT MESSAGE (OUTPATIENT)
Dept: FAMILY MEDICINE CLINIC | Facility: CLINIC | Age: 59
End: 2024-08-20
Payer: COMMERCIAL

## 2024-08-26 RX ORDER — LISINOPRIL 40 MG/1
40 TABLET ORAL DAILY
Qty: 30 TABLET | Refills: 1 | Status: SHIPPED | OUTPATIENT
Start: 2024-08-26

## 2024-09-26 RX ORDER — LISINOPRIL 40 MG/1
40 TABLET ORAL DAILY
Qty: 30 TABLET | Refills: 1 | Status: SHIPPED | OUTPATIENT
Start: 2024-09-26

## 2025-01-22 RX ORDER — LISINOPRIL 40 MG/1
40 TABLET ORAL DAILY
Qty: 30 TABLET | Refills: 0 | Status: SHIPPED | OUTPATIENT
Start: 2025-01-22

## 2025-02-24 RX ORDER — LISINOPRIL 40 MG/1
40 TABLET ORAL DAILY
Qty: 30 TABLET | Refills: 0 | Status: SHIPPED | OUTPATIENT
Start: 2025-02-24

## 2025-03-10 ENCOUNTER — TELEPHONE (OUTPATIENT)
Dept: FAMILY MEDICINE CLINIC | Facility: CLINIC | Age: 60
End: 2025-03-10
Payer: COMMERCIAL

## 2025-03-10 NOTE — TELEPHONE ENCOUNTER
Not bad on the blood pressure. Goal is less than 140/90 so continue with current treatment plan and medications. If gets above goal let me know. Thanks

## 2025-03-24 RX ORDER — LISINOPRIL 40 MG/1
40 TABLET ORAL DAILY
Qty: 30 TABLET | Refills: 0 | OUTPATIENT
Start: 2025-03-24

## 2025-03-28 ENCOUNTER — OFFICE VISIT (OUTPATIENT)
Dept: FAMILY MEDICINE CLINIC | Facility: CLINIC | Age: 60
End: 2025-03-28
Payer: COMMERCIAL

## 2025-03-28 VITALS
HEART RATE: 74 BPM | DIASTOLIC BLOOD PRESSURE: 84 MMHG | HEIGHT: 69 IN | WEIGHT: 232.44 LBS | OXYGEN SATURATION: 98 % | BODY MASS INDEX: 34.43 KG/M2 | SYSTOLIC BLOOD PRESSURE: 138 MMHG

## 2025-03-28 DIAGNOSIS — Z13.21 ENCOUNTER FOR VITAMIN DEFICIENCY SCREENING: ICD-10-CM

## 2025-03-28 DIAGNOSIS — I10 BENIGN ESSENTIAL HTN: Primary | ICD-10-CM

## 2025-03-28 DIAGNOSIS — E78.2 MIXED HYPERLIPIDEMIA: ICD-10-CM

## 2025-03-28 DIAGNOSIS — R73.03 PREDIABETES: ICD-10-CM

## 2025-03-28 LAB
BILIRUB BLD-MCNC: NEGATIVE MG/DL
CLARITY, POC: CLEAR
COLOR UR: YELLOW
EXPIRATION DATE: NORMAL
GLUCOSE UR STRIP-MCNC: NEGATIVE MG/DL
KETONES UR QL: NEGATIVE
LEUKOCYTE EST, POC: NEGATIVE
Lab: NORMAL
NITRITE UR-MCNC: NEGATIVE MG/ML
PH UR: 6 [PH] (ref 5–8)
PROT UR STRIP-MCNC: NEGATIVE MG/DL
RBC # UR STRIP: NEGATIVE /UL
SP GR UR: 1.02 (ref 1–1.03)
UROBILINOGEN UR QL: NORMAL

## 2025-03-28 PROCEDURE — 99213 OFFICE O/P EST LOW 20 MIN: CPT | Performed by: NURSE PRACTITIONER

## 2025-03-28 PROCEDURE — 81003 URINALYSIS AUTO W/O SCOPE: CPT | Performed by: NURSE PRACTITIONER

## 2025-03-28 RX ORDER — LISINOPRIL 40 MG/1
40 TABLET ORAL DAILY
Qty: 90 TABLET | Refills: 1 | Status: SHIPPED | OUTPATIENT
Start: 2025-03-28

## 2025-03-28 NOTE — PROGRESS NOTES
"Chief Complaint  Hypertension    Subjective          KRISTIAN Humphries presents to [facility]  History of Present Illness  History of Present Illness  The patient is a 59-year-old male here for a follow-up of hypertension. He is currently on lisinopril.    His blood pressure readings have been within the normal range during the morning hours, typically around 130/83 or 127/80. However, he has observed a slight elevation in the afternoon, post-work, with readings approximately 140/90. During a recent dental visit, his blood pressure was recorded as 155/88 at 3:00 PM using a wrist monitor. He utilizes an arm monitor for home readings. He reports feeling well overall but has not monitored his blood pressure recently. His current medication regimen includes lisinopril, administered at night.    He is inquiring about the necessity of vitamin supplements at his age, as he does not currently take any multivitamins. He has been consuming mushroom gummies, purportedly beneficial for brain health, and vitamin C gummies. .    He has been experiencing allergies and has been taking Zyrtec at night. He does not use Flonase.    FAMILY HISTORY  Dementia runs in his family on both sides.    MEDICATIONS  Current: Lisinopril, Zyrtec    Patient Care Team:  Trenton Cook APRN as PCP - General (Nurse Practitioner)     Objective   Vital Signs:   /84   Pulse 74   Ht 175.3 cm (69\")   Wt 105 kg (232 lb 7 oz)   SpO2 98%   BMI 34.33 kg/m²     Body mass index is 34.33 kg/m².    Review of Systems   Constitutional:  Negative for chills, fatigue and fever.   HENT:  Negative for congestion.    Eyes:  Negative for visual disturbance.   Respiratory:  Negative for cough, shortness of breath and wheezing.    Cardiovascular: Negative.    Gastrointestinal:  Negative for abdominal pain, constipation, diarrhea, nausea and vomiting.   Genitourinary:  Negative for decreased urine volume, dysuria, frequency, " hematuria and urgency.   Musculoskeletal:  Negative for back pain.   Skin:  Negative for rash.   Neurological:  Negative for dizziness, weakness, light-headedness, headache and confusion.   Psychiatric/Behavioral:  Negative for suicidal ideas.        Past History:  Medical History: has a past medical history of GERD (gastroesophageal reflux disease) and Hypertension.   Surgical History: has no past surgical history on file.   Family History: family history includes Alzheimer's disease in his mother; Coronary artery disease in his mother.   Social History: reports that he has never smoked. He has never used smokeless tobacco. He reports current alcohol use of about 2.0 - 3.0 standard drinks of alcohol per week. He reports that he does not use drugs.    PHQ-2 Depression Screening  Little interest or pleasure in doing things? Not at all   Feeling down, depressed, or hopeless? Not at all   PHQ-2 Total Score 0       PHQ-9 Depression Screening  Little interest or pleasure in doing things? Not at all   Feeling down, depressed, or hopeless? Not at all   PHQ-2 Total Score 0   Trouble falling or staying asleep, or sleeping too much?     Feeling tired or having little energy?     Poor appetite or overeating?     Feeling bad about yourself - or that you are a failure or have let yourself or your family down?     Trouble concentrating on things, such as reading the newspaper or watching television?     Moving or speaking so slowly that other people could have noticed? Or the opposite - being so fidgety or restless that you have been moving around a lot more than usual?     Thoughts that you would be better off dead, or of hurting yourself in some way?     PHQ-9 Total Score     If you checked off any problems, how difficult have these problems made it for you to do your work, take care of things at home, or get along with other people? Not difficult at all        PHQ-9 Total Score:        Patient screened positive for depression  based on a PHQ-9 score of  on . Follow-up recommendations include:          Current Outpatient Medications:     famotidine (PEPCID) 20 MG tablet, Take 1 tablet by mouth 2 (Two) Times a Day. OTC, Disp: , Rfl:     lisinopril (PRINIVIL,ZESTRIL) 40 MG tablet, Take 1 tablet by mouth Daily., Disp: 90 tablet, Rfl: 1   (Not in a hospital admission)     Allergies: Patient has no known allergies.    Physical Exam  Constitutional:       Appearance: Normal appearance.   Eyes:      Conjunctiva/sclera: Conjunctivae normal.      Pupils: Pupils are equal, round, and reactive to light.   Cardiovascular:      Rate and Rhythm: Normal rate and regular rhythm.      Heart sounds: Normal heart sounds.   Pulmonary:      Effort: Pulmonary effort is normal.      Breath sounds: Normal breath sounds.   Neurological:      General: No focal deficit present.      Mental Status: He is alert and oriented to person, place, and time. Mental status is at baseline.   Psychiatric:         Mood and Affect: Mood normal.         Behavior: Behavior normal.         Thought Content: Thought content normal.         Judgment: Judgment normal.        Physical Exam  Lungs were auscultated.  Heart was examined.    Vital Signs  Blood pressure is 138/84.    Result Review :          Results               Assessment and Plan    Diagnoses and all orders for this visit:    1. Benign essential HTN (Primary)  -     lisinopril (PRINIVIL,ZESTRIL) 40 MG tablet; Take 1 tablet by mouth Daily.  Dispense: 90 tablet; Refill: 1  -     CBC & Differential  -     Comprehensive Metabolic Panel  -     POC Urinalysis Dipstick, Automated; Future  -     POC Urinalysis Dipstick, Automated    2. Prediabetes  -     Hemoglobin A1c    3. Encounter for vitamin deficiency screening  -     Vitamin D,25-Hydroxy  -     Vitamin B12    4. Mixed hyperlipidemia  -     Lipid Panel      Assessment & Plan  1. Hypertension.  His blood pressure readings are generally within acceptable limits, with  occasional elevations to 140/90 in the afternoon. He is advised to continue his current regimen without adding any new medications to avoid excessively low morning readings. He should switch his lisinopril intake to the morning to help balance his blood pressure throughout the day. If his morning readings start reaching 140, he should notify the office.  Goal blood pressure less than 140/90.  Let me know if gets to or above that.  Proper diet and exercise plan discussed and encouraged.  Labs drawn.  Risk of meds discussed and understood.  Education provided.  Return to clinic or ED with any issues or concerns.    2. Health maintenance.  He is advised to incorporate a basic multivitamin into his daily routine, such as Centrum Silver. A comprehensive blood work panel will be ordered to assess liver and kidney function, cholesterol levels, and A1c. Additionally, vitamin D and B levels will be checked. If deficiencies in vitamin D or B are detected, he will be advised to take specific supplements for these vitamins in addition to his multivitamin.    3. Allergies.  He is advised to continue taking Zyrtec at night and to add Flonase if he experiences allergy symptoms such as a runny nose.                    Follow Up   Return in about 6 months (around 9/28/2025) for Annual physical.  Patient was given instructions and counseling regarding his condition or for health maintenance advice. Please see specific information pulled into the AVS if appropriate.     Patient or patient representative verbalized consent for the use of Ambient Listening during the visit with  MARY KAY Ireland for chart documentation. 3/31/2025  09:42 EDT    MARY KAY Ireland

## 2025-03-29 LAB
25(OH)D3+25(OH)D2 SERPL-MCNC: 25.6 NG/ML (ref 30–100)
ALBUMIN SERPL-MCNC: 4.4 G/DL (ref 3.9–4.9)
ALP SERPL-CCNC: 74 IU/L (ref 44–121)
ALT SERPL-CCNC: 15 IU/L (ref 0–44)
AST SERPL-CCNC: 26 IU/L (ref 0–40)
BASOPHILS # BLD AUTO: 0.1 X10E3/UL (ref 0–0.2)
BASOPHILS NFR BLD AUTO: 2 %
BILIRUB SERPL-MCNC: 1 MG/DL (ref 0–1.2)
BUN SERPL-MCNC: 10 MG/DL (ref 8–27)
BUN/CREAT SERPL: 13 (ref 10–24)
CALCIUM SERPL-MCNC: 9 MG/DL (ref 8.6–10.2)
CHLORIDE SERPL-SCNC: 99 MMOL/L (ref 96–106)
CHOLEST SERPL-MCNC: 170 MG/DL (ref 100–199)
CO2 SERPL-SCNC: 23 MMOL/L (ref 20–29)
CREAT SERPL-MCNC: 0.78 MG/DL (ref 0.76–1.27)
EGFRCR SERPLBLD CKD-EPI 2021: 101 ML/MIN/1.73
EOSINOPHIL # BLD AUTO: 0.6 X10E3/UL (ref 0–0.4)
EOSINOPHIL NFR BLD AUTO: 11 %
ERYTHROCYTE [DISTWIDTH] IN BLOOD BY AUTOMATED COUNT: 12.2 % (ref 11.6–15.4)
GLOBULIN SER CALC-MCNC: 2.5 G/DL (ref 1.5–4.5)
GLUCOSE SERPL-MCNC: 85 MG/DL (ref 70–99)
HBA1C MFR BLD: 5.6 % (ref 4.8–5.6)
HCT VFR BLD AUTO: 41 % (ref 37.5–51)
HDLC SERPL-MCNC: 55 MG/DL
HGB BLD-MCNC: 13.9 G/DL (ref 13–17.7)
IMM GRANULOCYTES # BLD AUTO: 0 X10E3/UL (ref 0–0.1)
IMM GRANULOCYTES NFR BLD AUTO: 0 %
LDLC SERPL CALC-MCNC: 102 MG/DL (ref 0–99)
LYMPHOCYTES # BLD AUTO: 1.6 X10E3/UL (ref 0.7–3.1)
LYMPHOCYTES NFR BLD AUTO: 30 %
MCH RBC QN AUTO: 29.9 PG (ref 26.6–33)
MCHC RBC AUTO-ENTMCNC: 33.9 G/DL (ref 31.5–35.7)
MCV RBC AUTO: 88 FL (ref 79–97)
MONOCYTES # BLD AUTO: 0.4 X10E3/UL (ref 0.1–0.9)
MONOCYTES NFR BLD AUTO: 8 %
NEUTROPHILS # BLD AUTO: 2.6 X10E3/UL (ref 1.4–7)
NEUTROPHILS NFR BLD AUTO: 49 %
PLATELET # BLD AUTO: 309 X10E3/UL (ref 150–450)
POTASSIUM SERPL-SCNC: 4.7 MMOL/L (ref 3.5–5.2)
PROT SERPL-MCNC: 6.9 G/DL (ref 6–8.5)
RBC # BLD AUTO: 4.65 X10E6/UL (ref 4.14–5.8)
SODIUM SERPL-SCNC: 135 MMOL/L (ref 134–144)
TRIGL SERPL-MCNC: 65 MG/DL (ref 0–149)
VIT B12 SERPL-MCNC: 441 PG/ML (ref 232–1245)
VLDLC SERPL CALC-MCNC: 13 MG/DL (ref 5–40)
WBC # BLD AUTO: 5.2 X10E3/UL (ref 3.4–10.8)